# Patient Record
Sex: MALE | Race: WHITE | HISPANIC OR LATINO | URBAN - METROPOLITAN AREA
[De-identification: names, ages, dates, MRNs, and addresses within clinical notes are randomized per-mention and may not be internally consistent; named-entity substitution may affect disease eponyms.]

---

## 2018-12-18 ENCOUNTER — OUTPATIENT (OUTPATIENT)
Dept: OUTPATIENT SERVICES | Facility: HOSPITAL | Age: 33
LOS: 1 days | End: 2018-12-18
Payer: COMMERCIAL

## 2018-12-18 ENCOUNTER — RESULT REVIEW (OUTPATIENT)
Age: 33
End: 2018-12-18

## 2018-12-18 ENCOUNTER — APPOINTMENT (OUTPATIENT)
Dept: CT IMAGING | Facility: HOSPITAL | Age: 33
End: 2018-12-18

## 2018-12-18 DIAGNOSIS — D72.829 ELEVATED WHITE BLOOD CELL COUNT, UNSPECIFIED: ICD-10-CM

## 2018-12-18 DIAGNOSIS — Z96.669 PRESENCE OF UNSPECIFIED ARTIFICIAL ANKLE JOINT: Chronic | ICD-10-CM

## 2018-12-18 DIAGNOSIS — M35.2 BEHCET'S DISEASE: ICD-10-CM

## 2018-12-18 DIAGNOSIS — Z00.00 ENCOUNTER FOR GENERAL ADULT MEDICAL EXAMINATION WITHOUT ABNORMAL FINDINGS: ICD-10-CM

## 2018-12-18 PROCEDURE — 85097 BONE MARROW INTERPRETATION: CPT

## 2018-12-18 PROCEDURE — 88364 INSITU HYBRIDIZATION (FISH): CPT | Mod: 26

## 2018-12-18 PROCEDURE — 81276 KRAS GENE ADDL VARIANTS: CPT

## 2018-12-18 PROCEDURE — 88185 FLOWCYTOMETRY/TC ADD-ON: CPT

## 2018-12-18 PROCEDURE — 88365 INSITU HYBRIDIZATION (FISH): CPT | Mod: 26,59

## 2018-12-18 PROCEDURE — 88184 FLOWCYTOMETRY/ TC 1 MARKER: CPT

## 2018-12-18 PROCEDURE — 77012 CT SCAN FOR NEEDLE BIOPSY: CPT | Mod: 26

## 2018-12-18 PROCEDURE — 88342 IMHCHEM/IMCYTCHM 1ST ANTB: CPT

## 2018-12-18 PROCEDURE — 88275 CYTOGENETICS 100-300: CPT

## 2018-12-18 PROCEDURE — 81272 KIT GENE TARGETED SEQ ANALYS: CPT

## 2018-12-18 PROCEDURE — 81210 BRAF GENE: CPT

## 2018-12-18 PROCEDURE — 81120 IDH1 COMMON VARIANTS: CPT

## 2018-12-18 PROCEDURE — 81170 ABL1 GENE: CPT

## 2018-12-18 PROCEDURE — 81310 NPM1 GENE: CPT

## 2018-12-18 PROCEDURE — 88189 FLOWCYTOMETRY/READ 16 & >: CPT | Mod: 59

## 2018-12-18 PROCEDURE — 81245 FLT3 GENE: CPT

## 2018-12-18 PROCEDURE — 81402 MOPATH PROCEDURE LEVEL 3: CPT

## 2018-12-18 PROCEDURE — 81121 IDH2 COMMON VARIANTS: CPT

## 2018-12-18 PROCEDURE — 81275 KRAS GENE VARIANTS EXON 2: CPT

## 2018-12-18 PROCEDURE — 88280 CHROMOSOME KARYOTYPE STUDY: CPT

## 2018-12-18 PROCEDURE — 81311 NRAS GENE VARIANTS EXON 2&3: CPT

## 2018-12-18 PROCEDURE — 88237 TISSUE CULTURE BONE MARROW: CPT

## 2018-12-18 PROCEDURE — 88342 IMHCHEM/IMCYTCHM 1ST ANTB: CPT | Mod: 26

## 2018-12-18 PROCEDURE — 88365 INSITU HYBRIDIZATION (FISH): CPT

## 2018-12-18 PROCEDURE — 81219 CALR GENE COM VARIANTS: CPT

## 2018-12-18 PROCEDURE — 88264 CHROMOSOME ANALYSIS 20-25: CPT

## 2018-12-18 PROCEDURE — 20225 BONE BIOPSY TROCAR/NDL DEEP: CPT

## 2018-12-18 PROCEDURE — 81405 MOPATH PROCEDURE LEVEL 6: CPT

## 2018-12-18 PROCEDURE — 81270 JAK2 GENE: CPT

## 2018-12-18 PROCEDURE — 81403 MOPATH PROCEDURE LEVEL 4: CPT

## 2018-12-18 PROCEDURE — 88271 CYTOGENETICS DNA PROBE: CPT

## 2018-12-18 PROCEDURE — 81246 FLT3 GENE ANALYSIS: CPT

## 2018-12-18 PROCEDURE — 88291 CYTO/MOLECULAR REPORT: CPT

## 2018-12-18 PROCEDURE — 88313 SPECIAL STAINS GROUP 2: CPT | Mod: 26

## 2018-12-18 PROCEDURE — 77012 CT SCAN FOR NEEDLE BIOPSY: CPT

## 2018-12-18 PROCEDURE — 88305 TISSUE EXAM BY PATHOLOGIST: CPT

## 2018-12-18 PROCEDURE — 88364 INSITU HYBRIDIZATION (FISH): CPT

## 2018-12-18 PROCEDURE — 87205 SMEAR GRAM STAIN: CPT

## 2018-12-18 PROCEDURE — 88313 SPECIAL STAINS GROUP 2: CPT

## 2018-12-18 PROCEDURE — 88305 TISSUE EXAM BY PATHOLOGIST: CPT | Mod: 26

## 2018-12-19 LAB — TM INTERPRETATION: SIGNIFICANT CHANGE UP

## 2018-12-20 LAB — CHROM ANALY INTERPHASE BLD FISH-IMP: SIGNIFICANT CHANGE UP

## 2018-12-21 LAB — HEMATOPATHOLOGY REPORT: SIGNIFICANT CHANGE UP

## 2019-01-02 LAB
BLUEBERRY IGG RAST: SIGNIFICANT CHANGE UP
CARP IGE RAST: SIGNIFICANT CHANGE UP
CARROT IGG RAST: SIGNIFICANT CHANGE UP
CAULIFLOWER IGG RAST: SIGNIFICANT CHANGE UP
CHROM ANALY OVERALL INTERP SPEC-IMP: SIGNIFICANT CHANGE UP
ONKOSIGHT MYELOID SEQUENCE: NORMAL — SIGNIFICANT CHANGE UP

## 2019-02-05 DIAGNOSIS — D47.1 CHRONIC MYELOPROLIFERATIVE DISEASE: ICD-10-CM

## 2021-06-21 ENCOUNTER — EMERGENCY (EMERGENCY)
Facility: HOSPITAL | Age: 36
LOS: 1 days | Discharge: HOME | End: 2021-06-21
Attending: EMERGENCY MEDICINE
Payer: COMMERCIAL

## 2021-06-21 VITALS
WEIGHT: 248.9 LBS | SYSTOLIC BLOOD PRESSURE: 111 MMHG | OXYGEN SATURATION: 99 % | HEIGHT: 69 IN | DIASTOLIC BLOOD PRESSURE: 76 MMHG | HEART RATE: 111 BPM | TEMPERATURE: 98 F | RESPIRATION RATE: 20 BRPM

## 2021-06-21 DIAGNOSIS — Z96.669 PRESENCE OF UNSPECIFIED ARTIFICIAL ANKLE JOINT: Chronic | ICD-10-CM

## 2021-06-21 LAB
ALBUMIN SERPL ELPH-MCNC: 4.6 G/DL — SIGNIFICANT CHANGE UP (ref 3.3–5)
ALP SERPL-CCNC: 82 U/L — SIGNIFICANT CHANGE UP (ref 40–120)
ALT FLD-CCNC: 39 U/L — SIGNIFICANT CHANGE UP (ref 10–45)
ANION GAP SERPL CALC-SCNC: 14 MMOL/L — SIGNIFICANT CHANGE UP (ref 5–17)
APPEARANCE UR: CLEAR — SIGNIFICANT CHANGE UP
AST SERPL-CCNC: 21 U/L — SIGNIFICANT CHANGE UP (ref 10–40)
BACTERIA # UR AUTO: 0 — SIGNIFICANT CHANGE UP
BASE EXCESS BLDV CALC-SCNC: 4.1 MMOL/L — HIGH (ref -2–2)
BASOPHILS # BLD AUTO: 0.05 K/UL — SIGNIFICANT CHANGE UP (ref 0–0.2)
BASOPHILS NFR BLD AUTO: 0.3 % — SIGNIFICANT CHANGE UP (ref 0–2)
BILIRUB SERPL-MCNC: 0.5 MG/DL — SIGNIFICANT CHANGE UP (ref 0.2–1.2)
BILIRUB UR-MCNC: NEGATIVE — SIGNIFICANT CHANGE UP
BUN SERPL-MCNC: 22 MG/DL — SIGNIFICANT CHANGE UP (ref 7–23)
CA-I SERPL-SCNC: 1.11 MMOL/L — LOW (ref 1.12–1.3)
CALCIUM SERPL-MCNC: 10.1 MG/DL — SIGNIFICANT CHANGE UP (ref 8.4–10.5)
CHLORIDE BLDV-SCNC: 106 MMOL/L — SIGNIFICANT CHANGE UP (ref 96–108)
CHLORIDE SERPL-SCNC: 101 MMOL/L — SIGNIFICANT CHANGE UP (ref 96–108)
CO2 BLDV-SCNC: 29 MMOL/L — SIGNIFICANT CHANGE UP (ref 22–30)
CO2 SERPL-SCNC: 24 MMOL/L — SIGNIFICANT CHANGE UP (ref 22–31)
COLOR SPEC: YELLOW — SIGNIFICANT CHANGE UP
CREAT SERPL-MCNC: 0.69 MG/DL — SIGNIFICANT CHANGE UP (ref 0.5–1.3)
DIFF PNL FLD: NEGATIVE — SIGNIFICANT CHANGE UP
EOSINOPHIL # BLD AUTO: 0.1 K/UL — SIGNIFICANT CHANGE UP (ref 0–0.5)
EOSINOPHIL NFR BLD AUTO: 0.6 % — SIGNIFICANT CHANGE UP (ref 0–6)
EPI CELLS # UR: 0 /HPF — SIGNIFICANT CHANGE UP
GAS PNL BLDV: 135 MMOL/L — SIGNIFICANT CHANGE UP (ref 135–145)
GAS PNL BLDV: SIGNIFICANT CHANGE UP
GAS PNL BLDV: SIGNIFICANT CHANGE UP
GLUCOSE BLDV-MCNC: 109 MG/DL — HIGH (ref 70–99)
GLUCOSE SERPL-MCNC: 92 MG/DL — SIGNIFICANT CHANGE UP (ref 70–99)
GLUCOSE UR QL: NEGATIVE — SIGNIFICANT CHANGE UP
HCO3 BLDV-SCNC: 28 MMOL/L — SIGNIFICANT CHANGE UP (ref 21–29)
HCT VFR BLD CALC: 47.4 % — SIGNIFICANT CHANGE UP (ref 39–50)
HCT VFR BLDA CALC: 44 % — SIGNIFICANT CHANGE UP (ref 39–50)
HGB BLD CALC-MCNC: 14.5 G/DL — SIGNIFICANT CHANGE UP (ref 13–17)
HGB BLD-MCNC: 15.5 G/DL — SIGNIFICANT CHANGE UP (ref 13–17)
HOROWITZ INDEX BLDV+IHG-RTO: SIGNIFICANT CHANGE UP
IMM GRANULOCYTES NFR BLD AUTO: 0.4 % — SIGNIFICANT CHANGE UP (ref 0–1.5)
KETONES UR-MCNC: SIGNIFICANT CHANGE UP
LACTATE BLDV-MCNC: 1.5 MMOL/L — SIGNIFICANT CHANGE UP (ref 0.7–2)
LEUKOCYTE ESTERASE UR-ACNC: NEGATIVE — SIGNIFICANT CHANGE UP
LIDOCAIN IGE QN: 13 U/L — SIGNIFICANT CHANGE UP (ref 7–60)
LYMPHOCYTES # BLD AUTO: 0.85 K/UL — LOW (ref 1–3.3)
LYMPHOCYTES # BLD AUTO: 5.3 % — LOW (ref 13–44)
MCHC RBC-ENTMCNC: 30.9 PG — SIGNIFICANT CHANGE UP (ref 27–34)
MCHC RBC-ENTMCNC: 32.7 GM/DL — SIGNIFICANT CHANGE UP (ref 32–36)
MCV RBC AUTO: 94.6 FL — SIGNIFICANT CHANGE UP (ref 80–100)
MONOCYTES # BLD AUTO: 0.73 K/UL — SIGNIFICANT CHANGE UP (ref 0–0.9)
MONOCYTES NFR BLD AUTO: 4.5 % — SIGNIFICANT CHANGE UP (ref 2–14)
NEUTROPHILS # BLD AUTO: 14.38 K/UL — HIGH (ref 1.8–7.4)
NEUTROPHILS NFR BLD AUTO: 88.9 % — HIGH (ref 43–77)
NITRITE UR-MCNC: NEGATIVE — SIGNIFICANT CHANGE UP
NRBC # BLD: 0 /100 WBCS — SIGNIFICANT CHANGE UP (ref 0–0)
OTHER CELLS CSF MANUAL: 16 ML/DL — LOW (ref 18–22)
PCO2 BLDV: 42 MMHG — SIGNIFICANT CHANGE UP (ref 35–50)
PH BLDV: 7.44 — SIGNIFICANT CHANGE UP (ref 7.35–7.45)
PH UR: 6.5 — SIGNIFICANT CHANGE UP (ref 5–8)
PLATELET # BLD AUTO: 261 K/UL — SIGNIFICANT CHANGE UP (ref 150–400)
PO2 BLDV: 44 MMHG — SIGNIFICANT CHANGE UP (ref 25–45)
POTASSIUM BLDV-SCNC: 5.1 MMOL/L — SIGNIFICANT CHANGE UP (ref 3.5–5.3)
POTASSIUM SERPL-MCNC: 3.9 MMOL/L — SIGNIFICANT CHANGE UP (ref 3.5–5.3)
POTASSIUM SERPL-SCNC: 3.9 MMOL/L — SIGNIFICANT CHANGE UP (ref 3.5–5.3)
PROT SERPL-MCNC: 8.2 G/DL — SIGNIFICANT CHANGE UP (ref 6–8.3)
PROT UR-MCNC: ABNORMAL
RBC # BLD: 5.01 M/UL — SIGNIFICANT CHANGE UP (ref 4.2–5.8)
RBC # FLD: 13.4 % — SIGNIFICANT CHANGE UP (ref 10.3–14.5)
RBC CASTS # UR COMP ASSIST: 2 /HPF — SIGNIFICANT CHANGE UP (ref 0–4)
SAO2 % BLDV: 79 % — SIGNIFICANT CHANGE UP (ref 67–88)
SARS-COV-2 RNA SPEC QL NAA+PROBE: SIGNIFICANT CHANGE UP
SODIUM SERPL-SCNC: 139 MMOL/L — SIGNIFICANT CHANGE UP (ref 135–145)
SP GR SPEC: 1.03 — HIGH (ref 1.01–1.02)
UROBILINOGEN FLD QL: NEGATIVE — SIGNIFICANT CHANGE UP
WBC # BLD: 16.17 K/UL — HIGH (ref 3.8–10.5)
WBC # FLD AUTO: 16.17 K/UL — HIGH (ref 3.8–10.5)
WBC UR QL: 1 /HPF — SIGNIFICANT CHANGE UP (ref 0–5)

## 2021-06-21 PROCEDURE — 76705 ECHO EXAM OF ABDOMEN: CPT | Mod: 26

## 2021-06-21 PROCEDURE — 99284 EMERGENCY DEPT VISIT MOD MDM: CPT

## 2021-06-21 RX ORDER — SODIUM CHLORIDE 9 MG/ML
1000 INJECTION, SOLUTION INTRAVENOUS ONCE
Refills: 0 | Status: COMPLETED | OUTPATIENT
Start: 2021-06-21 | End: 2021-06-21

## 2021-06-21 RX ORDER — CETIRIZINE HYDROCHLORIDE 10 MG/1
0 TABLET ORAL
Qty: 0 | Refills: 0 | DISCHARGE

## 2021-06-21 RX ORDER — COLCHICINE 0.6 MG
0 TABLET ORAL
Qty: 0 | Refills: 0 | DISCHARGE

## 2021-06-21 RX ORDER — TETANUS TOXOID, REDUCED DIPHTHERIA TOXOID AND ACELLULAR PERTUSSIS VACCINE, ADSORBED 5; 2.5; 8; 8; 2.5 [IU]/.5ML; [IU]/.5ML; UG/.5ML; UG/.5ML; UG/.5ML
0.5 SUSPENSION INTRAMUSCULAR ONCE
Refills: 0 | Status: COMPLETED | OUTPATIENT
Start: 2021-06-21 | End: 2021-06-21

## 2021-06-21 RX ORDER — VILAZODONE HYDROCHLORIDE 20 MG/1
1 TABLET, FILM COATED ORAL
Qty: 0 | Refills: 0 | DISCHARGE

## 2021-06-21 RX ORDER — MORPHINE SULFATE 50 MG/1
4 CAPSULE, EXTENDED RELEASE ORAL ONCE
Refills: 0 | Status: DISCONTINUED | OUTPATIENT
Start: 2021-06-21 | End: 2021-06-21

## 2021-06-21 RX ORDER — ROSUVASTATIN CALCIUM 5 MG/1
0 TABLET ORAL
Qty: 0 | Refills: 0 | DISCHARGE

## 2021-06-21 RX ORDER — SUCRALFATE 1 G
1 TABLET ORAL ONCE
Refills: 0 | Status: COMPLETED | OUTPATIENT
Start: 2021-06-21 | End: 2021-06-21

## 2021-06-21 RX ORDER — ACETAMINOPHEN 500 MG
975 TABLET ORAL ONCE
Refills: 0 | Status: COMPLETED | OUTPATIENT
Start: 2021-06-21 | End: 2021-06-21

## 2021-06-21 RX ORDER — IBUPROFEN 200 MG
600 TABLET ORAL ONCE
Refills: 0 | Status: COMPLETED | OUTPATIENT
Start: 2021-06-21 | End: 2021-06-21

## 2021-06-21 RX ORDER — ONDANSETRON 8 MG/1
4 TABLET, FILM COATED ORAL ONCE
Refills: 0 | Status: COMPLETED | OUTPATIENT
Start: 2021-06-21 | End: 2021-06-21

## 2021-06-21 RX ADMIN — ONDANSETRON 4 MILLIGRAM(S): 8 TABLET, FILM COATED ORAL at 19:22

## 2021-06-21 RX ADMIN — Medication 975 MILLIGRAM(S): at 20:21

## 2021-06-21 RX ADMIN — Medication 600 MILLIGRAM(S): at 20:21

## 2021-06-21 RX ADMIN — Medication 30 MILLILITER(S): at 19:24

## 2021-06-21 RX ADMIN — SODIUM CHLORIDE 1000 MILLILITER(S): 9 INJECTION, SOLUTION INTRAVENOUS at 19:24

## 2021-06-21 RX ADMIN — Medication 1 GRAM(S): at 22:04

## 2021-06-21 RX ADMIN — SODIUM CHLORIDE 1000 MILLILITER(S): 9 INJECTION, SOLUTION INTRAVENOUS at 23:09

## 2021-06-21 RX ADMIN — MORPHINE SULFATE 4 MILLIGRAM(S): 50 CAPSULE, EXTENDED RELEASE ORAL at 23:09

## 2021-06-21 NOTE — ED PROVIDER NOTE - PHYSICAL EXAMINATION
GEN: mild distress secondary to pain, awake, eyes open spontaneously, tearful  EYES: normal conjunctiva, perrl  ENT: NCAT, mildly dry MM, Trachea midline  CHEST/LUNGS: Non-tachypneic, CTAB, bilateral breath sounds  CARDIAC: Non-tachycardic, normal perfusion  ABDOMEN: Soft, NTND, No rebound/guarding  : Dr. Garland- no inguinal swelling or testicular tenderness to palpation  MSK: No edema, no gross deformity of extremities  SKIN: No rashes, no petechiae, no vesicles  NEURO: CN grossly intact, normal coordination, no focal motor or sensory deficits  PSYCH: Alert, appropriate, cooperative, with capacity and insight

## 2021-06-21 NOTE — ED PROVIDER NOTE - CLINICAL SUMMARY MEDICAL DECISION MAKING FREE TEXT BOX
Judson Shepherd MD, FACEP: In this physician's medical judgement based on clinical history and physical exam, patient with four episodes of emesis, urinary frequency, nausea, and vomiting x 4 today. Patient has decreased po intake. Patient reported pain to the epigastrium at triage but without pain here on history or clinical exam. will follow labs from triage, will treat symptoms as required and reassess.  Will follow up on labs, analgesia, imaging if necessary, reassess and disposition as clinically indicated. Judson Shepherd MD, FACEP: In this physician's medical judgement based on clinical history and physical exam, patient with four episodes of emesis, urinary frequency, nausea, and vomiting x 4 today. Patient has decreased po intake. Patient reported pain to the epigastrium at triage but without pain here on history or clinical exam. will follow labs from triage, will treat symptoms as required and reassess.  Will follow up on labs, analgesia, imaging if necessary, reassess and disposition as clinically indicated.  CTA and ultrasound added  ultrasound without pathology  pending CTA, patient treated with morphine, iSTOP evaluation and patient only on THC

## 2021-06-21 NOTE — ED ADULT NURSE NOTE - OBJECTIVE STATEMENT
Pt presents for eval of generalized abd pain, mostly periumbilical or in bilateral lower quadrants.  Abd soft.  Also reports n/v/d.  Oral mucosa moist.

## 2021-06-21 NOTE — ED PROVIDER NOTE - PROGRESS NOTE DETAILS
Judson Shepherd MD, FACEP patient complaining of vague epigastric and supraumbilical pain, cannot characterize the pain, tearful and pain is out of proportion, ultrasound right upper quadrant without gross pathology and CTA a/p ordered for vascular evaluation and evaluation for internal pathology consistent with patient's pain. Jake Garland, PGY-3: patient wants to leave, refusing to stay for results, states he will come back if called with abnormal results. Understands the risks of delayed diagnosis and worsening condition if leaves. States that his pain is improved. Jake Garland, PGY-3: ct negative for acute pathology, patient told the results, patient dc'd.

## 2021-06-21 NOTE — ED PROVIDER NOTE - PATIENT PORTAL LINK FT
You can access the FollowMyHealth Patient Portal offered by [facility] by registering at the following website: [email or web address]. By joining [employer]’s FollowMyHealth portal, you will also be able to view your health information using other applications (apps) compatible with our system.

## 2021-06-21 NOTE — ED PROVIDER NOTE - OBJECTIVE STATEMENT
34 y/o M hx bechets on colchicine- stable dose, here with n/v x1 day, 4 episodes of vomiting which he states is highly unusual for him. No current nausea, mild abd pain predominantly epigastric. Diarrhea above baseline for him. Afebrile but "feels cold". 36 y/o M hx bechets on colchicine- stable dose, here with n/v x1 day, 4 episodes of vomiting which he states is highly unusual for him. No current nausea, mild abd pain predominantly epigastric. Diarrhea above baseline for him. Afebrile but "feels cold". Of note did hit his head with a pipe while working on a car last week. No HA. Came 36 y/o M hx bechets on colchicine- stable dose, here with n/v x1 day, 4 episodes of vomiting which he states is highly unusual for him. No current nausea, mild abd pain predominantly epigastric. Diarrhea above baseline for him. Afebrile but "feels cold". Of note did hit his head with a pipe while working on a car last week. No HA. Came at request of friend who is a PA and is concerned about the head injury with vomiting.

## 2021-06-21 NOTE — ED PROVIDER NOTE - ATTENDING CONTRIBUTION TO CARE
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 Patient never has this issue and had four episodes of vomiting today. Patient had a head injury secondary to direct strike by a pipe last week without focal neurological deficits. + abdominal pain and diarrhea that is normal with history of colchicine use.   qpa labs and therapies noted   afebrile, benign exam, will monitor and observe and reassess  Will follow up on labs, analgesia, reassess and disposition as clinically indicated. 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 Patient never has this issue and had four episodes of vomiting today. Patient had a head injury secondary to direct strike by a pipe last week without focal neurological deficits. + abdominal pain and diarrhea that is normal with history of colchicine use.   qpa labs and therapies noted   afebrile, benign exam, will monitor and observe and reassess  Will follow up on labs, analgesia, reassess and disposition as clinically indicated.  See MDM above.

## 2021-06-21 NOTE — ED PROVIDER NOTE - NSFOLLOWUPINSTRUCTIONS_ED_ALL_ED_FT
WHAT YOU NEED TO KNOW:    Discharge against medical advice means that you choose to leave the hospital before your healthcare provider recommends that you do. Your healthcare provider may still need to diagnose or treat your condition or your treatment may not be complete.     Return to the emergency department if you symptoms worsen.      DISCHARGE INSTRUCTIONS:    Risks: Risks of leaving the hospital before your healthcare providers recommend include the following:  •Your condition may cause other health problems if not treated properly.       •You may need to be admitted to the hospital again for the same condition.       •Your condition could become life-threatening.       Follow up with your healthcare provider as directed: Write down your questions so you remember to ask them during your visits.       Abdominal Pain    WHAT YOU NEED TO KNOW:    Abdominal pain can be dull, achy, or sharp. You may have pain in one area of your abdomen, or in your entire abdomen. Your pain may be caused by a condition such as constipation, food sensitivity or poisoning, infection, or a blockage. Abdominal pain can also be from a hernia, appendicitis, or an ulcer. Liver, gallbladder, or kidney conditions can also cause abdominal pain. The cause of your abdominal pain may be unknown.     DISCHARGE INSTRUCTIONS:    Return to the emergency department if:   •You have new chest pain or shortness of breath.      •You have pulsing pain in your upper abdomen or lower back that suddenly becomes constant.      •Your pain is in the right lower abdominal area and worsens with movement.       •You have a fever over 100.4°F (38°C) or shaking chills.       •You are vomiting and cannot keep food or liquids down.       •Your pain does not improve or gets worse over the next 8 to 12 hours.       •You see blood in your vomit or bowel movements, or they look black and tarry.       •Your skin or the whites of your eyes turn yellow.       •You are a woman and have a large amount of vaginal bleeding that is not your monthly period.       Contact your healthcare provider if:   •You have pain in your lower back.       •You are a man and have pain in your testicles.      •You have pain when you urinate.       •You have questions or concerns about your condition or care.      Follow up with your healthcare provider within 24 hours or as directed: Write down your questions so you remember to ask them during your visits.     Medicines:   •Medicines may be given to calm your stomach and prevent vomiting or to decrease pain. Ask how to take pain medicine safely.      •Take your medicine as directed. Contact your healthcare provider if you think your medicine is not helping or if you have side effects. Tell him of her if you are allergic to any medicine. Keep a list of the medicines, vitamins, and herbs you take. Include the amounts, and when and why you take them. Bring the list or the pill bottles to follow-up visits. Carry your medicine list with you in case of an emergency. DISCHARGE INSTRUCTIONS:    Risks: Risks of leaving the hospital before your healthcare providers recommend include the following:  •Your condition may cause other health problems if not treated properly.       •You may need to be admitted to the hospital again for the same condition.       •Your condition could become life-threatening.       Follow up with your healthcare provider as directed: Write down your questions so you remember to ask them during your visits.       Abdominal Pain    WHAT YOU NEED TO KNOW:    Abdominal pain can be dull, achy, or sharp. You may have pain in one area of your abdomen, or in your entire abdomen. Your pain may be caused by a condition such as constipation, food sensitivity or poisoning, infection, or a blockage. Abdominal pain can also be from a hernia, appendicitis, or an ulcer. Liver, gallbladder, or kidney conditions can also cause abdominal pain. The cause of your abdominal pain may be unknown.     DISCHARGE INSTRUCTIONS:    Return to the emergency department if:   •You have new chest pain or shortness of breath.      •You have pulsing pain in your upper abdomen or lower back that suddenly becomes constant.      •Your pain is in the right lower abdominal area and worsens with movement.       •You have a fever over 100.4°F (38°C) or shaking chills.       •You are vomiting and cannot keep food or liquids down.       •Your pain does not improve or gets worse over the next 8 to 12 hours.       •You see blood in your vomit or bowel movements, or they look black and tarry.       •Your skin or the whites of your eyes turn yellow.       •You are a woman and have a large amount of vaginal bleeding that is not your monthly period.       Contact your healthcare provider if:   •You have pain in your lower back.       •You are a man and have pain in your testicles.      •You have pain when you urinate.       •You have questions or concerns about your condition or care.      Follow up with your healthcare provider within 24 hours or as directed: Write down your questions so you remember to ask them during your visits.     Medicines:   •Medicines may be given to calm your stomach and prevent vomiting or to decrease pain. Ask how to take pain medicine safely.      •Take your medicine as directed. Contact your healthcare provider if you think your medicine is not helping or if you have side effects. Tell him of her if you are allergic to any medicine. Keep a list of the medicines, vitamins, and herbs you take. Include the amounts, and when and why you take them. Bring the list or the pill bottles to follow-up visits. Carry your medicine list with you in case of an emergency.

## 2021-06-21 NOTE — ED ADULT NURSE REASSESSMENT NOTE - NS ED NURSE REASSESS COMMENT FT1
Received report from Violeta CRAWFORD. Patient AAOX3, c/o abdominal pain, medicated per ED MDs orders. VSS, will continue to monitor.

## 2021-06-21 NOTE — ED PROVIDER NOTE - SHIFT CHANGE DETAILS
Judson Shepherd MD FACEP note of transfer at the usual time of sign out: Receiving team will follow up on labs, analgesia, any clinical imaging, reassess and disposition as clinically indicated.  Details of patient and plan conveyed to receiving physician and conveyed back for understanding.  There were no questions at this time about the patient's status, disposition, and plan. Patient's care to be taken over by receiving physician at this time, all decisions regarding the progression of care will be made at their discretion.

## 2021-06-21 NOTE — ED PROVIDER NOTE - RAPID ASSESSMENT
35y M PMHx Behcet's disease p/w abd pain, numerous episodes of diarrhea, urinary frequency, nausea, 4 episodes of vomiting x 1 day. Endorses reduced PO intake 2/2 vomiting. Pt is particularly concerned over his vomiting because normally, it is rare for him to vomit, but he has had multiple episodes in the last 24 hours. Denies vision changes. No hx abd surgery. No blood thinner use. Pt also notes he hit his head a week ago, with copious bleeding.    IShravan (Scribe), have documented this rapid assessment note under the dictation of Guerrero Garcia (JULIA), which has been reviewed and affirmed to be accurate.    Scribe Statement: IShravan, attest that this documentation has been prepared under the direction and in the presence of Guerrero Garcia (JULIA) 35y M PMHx Behcet's disease p/w abd pain, numerous episodes of diarrhea, urinary frequency, nausea, 4 episodes of vomiting x 1 day. Endorses reduced PO intake 2/2 vomiting. Pt is particularly concerned over his vomiting because normally, it is rare for him to vomit, but he has had multiple episodes in the last 24 hours. Denies vision changes. No hx abd surgery. No blood thinner use. Pt also notes he hit his head a week ago, with copious bleeding that resolved w/direct pressure. denies LOC.    Shravan CASH (Antonella), have documented this rapid assessment note under the dictation of Guerrero Garcia (JULIA), which has been reviewed and affirmed to be accurate.    Scribe Statement: Shravan CASH, attest that this documentation has been prepared under the direction and in the presence of Guerrero Garcia (JULIA)    Guerrero CASH PA-C saw patient as a rapid assessment initially via telemedicine encounter. The rest of care to be performed by the primary ED team. Rapid assessment documented by antonella in my presence. I have personally reviewed and approved the note written by the kavonibe. Receiving team will follow up on labs, analgesia, any clinical imaging, and perform reassessment and disposition of the patient as clinically indicated. All decisions regarding the progression of care will be made at their discretion.

## 2021-06-22 VITALS
TEMPERATURE: 98 F | SYSTOLIC BLOOD PRESSURE: 147 MMHG | HEART RATE: 87 BPM | DIASTOLIC BLOOD PRESSURE: 77 MMHG | RESPIRATION RATE: 18 BRPM | OXYGEN SATURATION: 99 %

## 2021-06-22 PROCEDURE — 74174 CTA ABD&PLVS W/CONTRAST: CPT

## 2021-06-22 PROCEDURE — 83605 ASSAY OF LACTIC ACID: CPT

## 2021-06-22 PROCEDURE — 83690 ASSAY OF LIPASE: CPT

## 2021-06-22 PROCEDURE — 90471 IMMUNIZATION ADMIN: CPT

## 2021-06-22 PROCEDURE — 74174 CTA ABD&PLVS W/CONTRAST: CPT | Mod: 26,MA

## 2021-06-22 PROCEDURE — 84295 ASSAY OF SERUM SODIUM: CPT

## 2021-06-22 PROCEDURE — 85025 COMPLETE CBC W/AUTO DIFF WBC: CPT

## 2021-06-22 PROCEDURE — 85014 HEMATOCRIT: CPT

## 2021-06-22 PROCEDURE — 84132 ASSAY OF SERUM POTASSIUM: CPT

## 2021-06-22 PROCEDURE — 76705 ECHO EXAM OF ABDOMEN: CPT

## 2021-06-22 PROCEDURE — 80053 COMPREHEN METABOLIC PANEL: CPT

## 2021-06-22 PROCEDURE — 82565 ASSAY OF CREATININE: CPT

## 2021-06-22 PROCEDURE — 82947 ASSAY GLUCOSE BLOOD QUANT: CPT

## 2021-06-22 PROCEDURE — 87635 SARS-COV-2 COVID-19 AMP PRB: CPT

## 2021-06-22 PROCEDURE — 82435 ASSAY OF BLOOD CHLORIDE: CPT

## 2021-06-22 PROCEDURE — 81001 URINALYSIS AUTO W/SCOPE: CPT

## 2021-06-22 PROCEDURE — 96374 THER/PROPH/DIAG INJ IV PUSH: CPT | Mod: XU

## 2021-06-22 PROCEDURE — 82330 ASSAY OF CALCIUM: CPT

## 2021-06-22 PROCEDURE — 99284 EMERGENCY DEPT VISIT MOD MDM: CPT | Mod: 25

## 2021-06-22 PROCEDURE — 82803 BLOOD GASES ANY COMBINATION: CPT

## 2021-06-22 PROCEDURE — 96375 TX/PRO/DX INJ NEW DRUG ADDON: CPT | Mod: XU

## 2021-06-22 PROCEDURE — 85018 HEMOGLOBIN: CPT

## 2021-06-22 NOTE — ED ADULT NURSE REASSESSMENT NOTE - NS ED NURSE REASSESS COMMENT FT1
Patient does not want to stay any longer, ED MD Garland made aware. Pt told that we are waiting for CT results and doing the best we can.

## 2022-09-15 ENCOUNTER — EMERGENCY (EMERGENCY)
Facility: HOSPITAL | Age: 37
LOS: 1 days | Discharge: HOME | End: 2022-09-15
Attending: EMERGENCY MEDICINE
Payer: COMMERCIAL

## 2022-09-15 VITALS
RESPIRATION RATE: 20 BRPM | WEIGHT: 255.07 LBS | SYSTOLIC BLOOD PRESSURE: 117 MMHG | HEART RATE: 89 BPM | HEIGHT: 69 IN | DIASTOLIC BLOOD PRESSURE: 69 MMHG | OXYGEN SATURATION: 98 % | TEMPERATURE: 98 F

## 2022-09-15 DIAGNOSIS — Z96.669 PRESENCE OF UNSPECIFIED ARTIFICIAL ANKLE JOINT: Chronic | ICD-10-CM

## 2022-09-15 PROCEDURE — 99283 EMERGENCY DEPT VISIT LOW MDM: CPT

## 2022-09-16 ENCOUNTER — EMERGENCY (EMERGENCY)
Facility: HOSPITAL | Age: 37
LOS: 1 days | Discharge: HOME | End: 2022-09-16
Attending: EMERGENCY MEDICINE
Payer: COMMERCIAL

## 2022-09-16 VITALS
HEART RATE: 114 BPM | TEMPERATURE: 99 F | HEIGHT: 69 IN | OXYGEN SATURATION: 97 % | WEIGHT: 255.07 LBS | SYSTOLIC BLOOD PRESSURE: 137 MMHG | DIASTOLIC BLOOD PRESSURE: 76 MMHG | RESPIRATION RATE: 20 BRPM

## 2022-09-16 VITALS
SYSTOLIC BLOOD PRESSURE: 121 MMHG | TEMPERATURE: 98 F | DIASTOLIC BLOOD PRESSURE: 77 MMHG | HEART RATE: 94 BPM | RESPIRATION RATE: 15 BRPM | OXYGEN SATURATION: 100 %

## 2022-09-16 VITALS
HEART RATE: 95 BPM | TEMPERATURE: 98 F | SYSTOLIC BLOOD PRESSURE: 130 MMHG | OXYGEN SATURATION: 95 % | RESPIRATION RATE: 18 BRPM | DIASTOLIC BLOOD PRESSURE: 85 MMHG

## 2022-09-16 DIAGNOSIS — H60.90 UNSPECIFIED OTITIS EXTERNA, UNSPECIFIED EAR: ICD-10-CM

## 2022-09-16 DIAGNOSIS — Z96.669 PRESENCE OF UNSPECIFIED ARTIFICIAL ANKLE JOINT: Chronic | ICD-10-CM

## 2022-09-16 PROBLEM — M35.2 BEHCET'S DISEASE: Chronic | Status: ACTIVE | Noted: 2021-06-21

## 2022-09-16 LAB
ALBUMIN SERPL ELPH-MCNC: 4.6 G/DL — SIGNIFICANT CHANGE UP (ref 3.3–5)
ALP SERPL-CCNC: 88 U/L — SIGNIFICANT CHANGE UP (ref 40–120)
ALT FLD-CCNC: 30 U/L — SIGNIFICANT CHANGE UP (ref 10–45)
ANION GAP SERPL CALC-SCNC: 9 MMOL/L — SIGNIFICANT CHANGE UP (ref 5–17)
AST SERPL-CCNC: 15 U/L — SIGNIFICANT CHANGE UP (ref 10–40)
BASOPHILS # BLD AUTO: 0.29 K/UL — HIGH (ref 0–0.2)
BASOPHILS NFR BLD AUTO: 1.7 % — SIGNIFICANT CHANGE UP (ref 0–2)
BILIRUB SERPL-MCNC: 0.2 MG/DL — SIGNIFICANT CHANGE UP (ref 0.2–1.2)
BUN SERPL-MCNC: 14 MG/DL — SIGNIFICANT CHANGE UP (ref 7–23)
CALCIUM SERPL-MCNC: 9.4 MG/DL — SIGNIFICANT CHANGE UP (ref 8.4–10.5)
CHLORIDE SERPL-SCNC: 101 MMOL/L — SIGNIFICANT CHANGE UP (ref 96–108)
CO2 SERPL-SCNC: 29 MMOL/L — SIGNIFICANT CHANGE UP (ref 22–31)
CREAT SERPL-MCNC: 0.61 MG/DL — SIGNIFICANT CHANGE UP (ref 0.5–1.3)
EGFR: 129 ML/MIN/1.73M2 — SIGNIFICANT CHANGE UP
EOSINOPHIL # BLD AUTO: 0.29 K/UL — SIGNIFICANT CHANGE UP (ref 0–0.5)
EOSINOPHIL NFR BLD AUTO: 1.7 % — SIGNIFICANT CHANGE UP (ref 0–6)
GLUCOSE SERPL-MCNC: 120 MG/DL — HIGH (ref 70–99)
HCT VFR BLD CALC: 43.5 % — SIGNIFICANT CHANGE UP (ref 39–50)
HGB BLD-MCNC: 14.1 G/DL — SIGNIFICANT CHANGE UP (ref 13–17)
LYMPHOCYTES # BLD AUTO: 1.9 K/UL — SIGNIFICANT CHANGE UP (ref 1–3.3)
LYMPHOCYTES # BLD AUTO: 11 % — LOW (ref 13–44)
MANUAL SMEAR VERIFICATION: SIGNIFICANT CHANGE UP
MCHC RBC-ENTMCNC: 30.9 PG — SIGNIFICANT CHANGE UP (ref 27–34)
MCHC RBC-ENTMCNC: 32.4 GM/DL — SIGNIFICANT CHANGE UP (ref 32–36)
MCV RBC AUTO: 95.4 FL — SIGNIFICANT CHANGE UP (ref 80–100)
MONOCYTES # BLD AUTO: 1.18 K/UL — HIGH (ref 0–0.9)
MONOCYTES NFR BLD AUTO: 6.8 % — SIGNIFICANT CHANGE UP (ref 2–14)
NEUTROPHILS # BLD AUTO: 12.91 K/UL — HIGH (ref 1.8–7.4)
NEUTROPHILS NFR BLD AUTO: 74.6 % — SIGNIFICANT CHANGE UP (ref 43–77)
PLAT MORPH BLD: NORMAL — SIGNIFICANT CHANGE UP
PLATELET # BLD AUTO: 257 K/UL — SIGNIFICANT CHANGE UP (ref 150–400)
POTASSIUM SERPL-MCNC: 3.7 MMOL/L — SIGNIFICANT CHANGE UP (ref 3.5–5.3)
POTASSIUM SERPL-SCNC: 3.7 MMOL/L — SIGNIFICANT CHANGE UP (ref 3.5–5.3)
PROT SERPL-MCNC: 8.2 G/DL — SIGNIFICANT CHANGE UP (ref 6–8.3)
RBC # BLD: 4.56 M/UL — SIGNIFICANT CHANGE UP (ref 4.2–5.8)
RBC # FLD: 13.5 % — SIGNIFICANT CHANGE UP (ref 10.3–14.5)
RBC BLD AUTO: SIGNIFICANT CHANGE UP
SODIUM SERPL-SCNC: 139 MMOL/L — SIGNIFICANT CHANGE UP (ref 135–145)
VARIANT LYMPHS # BLD: 4.2 % — SIGNIFICANT CHANGE UP (ref 0–6)
WBC # BLD: 17.3 K/UL — HIGH (ref 3.8–10.5)
WBC # FLD AUTO: 17.3 K/UL — HIGH (ref 3.8–10.5)

## 2022-09-16 PROCEDURE — 70481 CT ORBIT/EAR/FOSSA W/DYE: CPT | Mod: 26,MA

## 2022-09-16 PROCEDURE — 99283 EMERGENCY DEPT VISIT LOW MDM: CPT

## 2022-09-16 PROCEDURE — 85025 COMPLETE CBC W/AUTO DIFF WBC: CPT

## 2022-09-16 PROCEDURE — 99284 EMERGENCY DEPT VISIT MOD MDM: CPT | Mod: 25

## 2022-09-16 PROCEDURE — 36415 COLL VENOUS BLD VENIPUNCTURE: CPT

## 2022-09-16 PROCEDURE — 96360 HYDRATION IV INFUSION INIT: CPT | Mod: XU

## 2022-09-16 PROCEDURE — 70481 CT ORBIT/EAR/FOSSA W/DYE: CPT | Mod: MA

## 2022-09-16 PROCEDURE — 99285 EMERGENCY DEPT VISIT HI MDM: CPT

## 2022-09-16 PROCEDURE — 80053 COMPREHEN METABOLIC PANEL: CPT

## 2022-09-16 PROCEDURE — 85652 RBC SED RATE AUTOMATED: CPT

## 2022-09-16 RX ORDER — SODIUM CHLORIDE 9 MG/ML
1000 INJECTION INTRAMUSCULAR; INTRAVENOUS; SUBCUTANEOUS ONCE
Refills: 0 | Status: COMPLETED | OUTPATIENT
Start: 2022-09-16 | End: 2022-09-16

## 2022-09-16 RX ORDER — ACETAMINOPHEN 500 MG
975 TABLET ORAL ONCE
Refills: 0 | Status: COMPLETED | OUTPATIENT
Start: 2022-09-16 | End: 2022-09-16

## 2022-09-16 RX ORDER — CIPROFLOXACIN AND DEXAMETHASONE 3; 1 MG/ML; MG/ML
4 SUSPENSION/ DROPS AURICULAR (OTIC)
Qty: 1 | Refills: 0
Start: 2022-09-16 | End: 2022-09-22

## 2022-09-16 RX ORDER — IBUPROFEN 200 MG
400 TABLET ORAL ONCE
Refills: 0 | Status: COMPLETED | OUTPATIENT
Start: 2022-09-16 | End: 2022-09-16

## 2022-09-16 RX ORDER — IBUPROFEN 200 MG
600 TABLET ORAL ONCE
Refills: 0 | Status: COMPLETED | OUTPATIENT
Start: 2022-09-16 | End: 2022-09-16

## 2022-09-16 RX ORDER — CIPROFLOXACIN HCL 0.3 %
1 DROPS OPHTHALMIC (EYE) ONCE
Refills: 0 | Status: COMPLETED | OUTPATIENT
Start: 2022-09-16 | End: 2022-09-16

## 2022-09-16 RX ADMIN — Medication 975 MILLIGRAM(S): at 19:31

## 2022-09-16 RX ADMIN — Medication 600 MILLIGRAM(S): at 19:31

## 2022-09-16 RX ADMIN — SODIUM CHLORIDE 1000 MILLILITER(S): 9 INJECTION INTRAMUSCULAR; INTRAVENOUS; SUBCUTANEOUS at 20:10

## 2022-09-16 RX ADMIN — SODIUM CHLORIDE 1000 MILLILITER(S): 9 INJECTION INTRAMUSCULAR; INTRAVENOUS; SUBCUTANEOUS at 21:10

## 2022-09-16 RX ADMIN — Medication 975 MILLIGRAM(S): at 20:01

## 2022-09-16 RX ADMIN — Medication 400 MILLIGRAM(S): at 01:38

## 2022-09-16 RX ADMIN — Medication 1 DROP(S): at 01:38

## 2022-09-16 RX ADMIN — Medication 600 MILLIGRAM(S): at 21:01

## 2022-09-16 NOTE — ED ADULT TRIAGE NOTE - NSWEIGHTCALCTOOLDRUG_GEN_A_CORE
Received refill request. Last appt date verified. Medication verified. Refill was given per protocol.
 used

## 2022-09-16 NOTE — ED ADULT NURSE NOTE - OBJECTIVE STATEMENT
patient is a 37 y/o M with no pertinent PMH who presents to the ED c/o worsening left ear pain x2 days. patient states that "I think I have an ear infection", and patient attempted to clean out ear with qtip which made the pain worse. patient states that he did not take any pain medicine PTA. denies hearing changes or discharge from ear. patient resting in NAD. a&ox4, ambulatory independently. respirations even and unlabored. abdomen soft and nondistended. skin warm, dry, and intact. ear drops administered. MD Hi at bedside to assess

## 2022-09-16 NOTE — ED PROVIDER NOTE - NSFOLLOWUPINSTRUCTIONS_ED_ALL_ED_FT
Please see the information of Otitis Media and Otitis Externa.    You are discharge against medical advice and understanding the possible dangers/risks after ED discharge.     Return for any concerns, fever, weakness, facial swelling, or worsening pain.     Keep continue your current medications as prescribed including Ciprodex.    Levaquin as prescribed.    Take Ibuprofen or Tylenol for pain as package directed.    Follow up with your ENT Dr or Dr. Ramachandran, call Monday for appointment.

## 2022-09-16 NOTE — ED PROVIDER NOTE - CPE EDP SKIN NORM
I will SWITCH the dose or number of times a day I take the medications listed below when I get home from the hospital:  None normal...

## 2022-09-16 NOTE — ED PROVIDER NOTE - PATIENT PORTAL LINK FT
You can access the FollowMyHealth Patient Portal offered by [facility] by registering at the following website: [email or web address]. By joining [email or web address]’s FollowMyHealth portal, you will also be able to view your health information using other applications (apps) compatible with our system.

## 2022-09-16 NOTE — ED PROVIDER NOTE - CARE PROVIDER_API CALL
Dima Ramachandran)  Otolaryngology  [address] [place] [address], [place] [address]  Phone: [phone]  Fax: [phone]  Follow Up Time:

## 2022-09-16 NOTE — ED PROVIDER NOTE - OBJECTIVE STATEMENT
37yo male pt with PMHx of Behcet's disease returned to ED for worsening left ear pain today. Reports he's had mild left otalgia since one week ago and worsening pain for 3days. Pt's evaluated in ED last night and on Ciprodex without relief. He noticed worsening pain with neck pain this afternoon. Denies fever, chills, cough or sore throat. Denies sensory changes or weakness to extremities. Denies CP/SOB/ABD pain or N/V/D.

## 2022-09-16 NOTE — ED PROVIDER NOTE - PATIENT PORTAL LINK FT
You can access the FollowMyHealth Patient Portal offered by [facility] by registering at the following website: [email or web address]. By joining [employer]’s FollowMyHealth portal, you will also be able to view your health information using other applications (apps) compatible with our system.

## 2022-09-16 NOTE — CONSULT NOTE ADULT - ASSESSMENT
35 yo M w/PMHx of Behcet's disease presents to the ED for L sided ear pain for several days, gradual onset, Pt reports severe pain to outer ear. Believes he has a ear infection after scratching his inner ear with dirty hands and attempted to clear ear out with a q-tip. Came to the ED yesterday and was discharged on ciprodex now c/o of worsening pain. Would recommend a CT at this time as pt is tachy and WBC is 17.3 37 yo M w/PMHx of Behcet's disease presents to the ED for L sided ear pain for several days, gradual onset, Pt reports severe pain to outer ear. Believes he has a ear infection after scratching his inner ear with dirty hands and attempted to clear ear out with a q-tip. Came to the ED yesterday and was discharged on ciprodex now c/o of worsening pain. CT found pt to have both OE and OM, pt started on IV antibiotics. Patient decided to leave [place].

## 2022-09-16 NOTE — ED PROVIDER NOTE - NSFOLLOWUPINSTRUCTIONS_ED_ALL_ED_FT
You were seen in the Emergency Department for an external ear infection. Please use the drops as instructed. Follow up with your primary care doctor and ENT.     1) Continue all previously prescribed medications as directed.    2) Follow up with your primary care physician - take copies of your results.    3) Return to the Emergency Department for worsening or persistent symptoms, and/or ANY NEW OR CONCERNING SYMPTOMS.

## 2022-09-16 NOTE — CONSULT NOTE ADULT - SUBJECTIVE AND OBJECTIVE BOX
CC: ear pain    HPI: 35 yo M w/PMHx of Behcet's disease presents to the ED for L sided ear pain for several days, gradual onset, Pt reports severe pain to outer ear. Believes he has a ear infection after scratching his inner ear with dirty hands and attempted to clear ear out with a q-tip. Denies hearing changes, fever, neck pain, and rashes. No hearing issues.        PAST MEDICAL & SURGICAL HISTORY:  Corneal abrasion      Rash/skin eruption      Behcet&#x27;s disease      S/P ankle joint replacement  L ankle ORIF - 2005        Allergies    No Known Allergies    Intolerances      MEDICATIONS  (STANDING):  sodium chloride 0.9% Bolus 1000 milliLiter(s) IV Bolus once    MEDICATIONS  (PRN):      Social History: smokes medical marijuana     Family history: no pertinent family history     ROS:   ENT: all negative except as noted in HPI   CV: denies palpitations  Pulm: denies SOB, cough, hemoptysis  GI: denies change in apetite, indigestion, n/v  : denies pertinent urinary symptoms, urgency  Neuro: denies numbness/tingling, loss of sensation  Psych: denies anxiety  MS: denies muscle weakness, instability  Heme: denies easy bruising or bleeding  Endo: denies heat/cold intolerance, excessive sweating  Vascular: denies LE edema    Vital Signs Last 24 Hrs  T(C): 36.7 (16 Sep 2022 19:25), Max: 37.3 (16 Sep 2022 17:56)  T(F): 98.1 (16 Sep 2022 19:25), Max: 99.1 (16 Sep 2022 17:56)  HR: 95 (16 Sep 2022 19:25) (89 - 114)  BP: 130/85 (16 Sep 2022 19:25) (117/69 - 137/76)  BP(mean): 100 (16 Sep 2022 19:25) (100 - 100)  RR: 18 (16 Sep 2022 19:25) (15 - 20)  SpO2: 95% (16 Sep 2022 19:25) (95% - 100%)    Parameters below as of 16 Sep 2022 19:25  Patient On (Oxygen Delivery Method): room air                              14.1   17.30 )-----------( 257      ( 16 Sep 2022 19:57 )             43.5             PHYSICAL EXAM:  Gen: NAD  Skin: No rashes, bruises, or lesions  Head: Normocephalic, Atraumatic  Face: no edema, erythema, or fluctuance. Parotid glands soft without mass  Eyes: no scleral injection  Ears: Right - ear canal clear, TM intact without effusion or erythema. No evidence of any fluid drainage. No mastoid tenderness, erythema, or ear bulging            Left - ear canal narrowed and inflamed unable to visualize TM. +drainage. + mastoid tenderness, but no erythema  Nose: Nares bilaterally patent, no discharge  Mouth: No Stridor / Drooling / Trismus.  Mucosa moist, tongue/uvula midline, oropharynx clear  Neck: Flat, supple, no lymphadenopathy, trachea midline, no masses  Lymphatic: No lymphadenopathy  Resp: breathing easily, no stridor  CV: no peripheral edema/cyanosis  GI: nondistended   Peripheral vascular: no JVD or edema  Neuro: facial nerve intact, no facial droop      IMAGING/ADDITIONAL STUDIES: pending  CC: ear pain    HPI: 35 yo M w/PMHx of Behcet's disease presents to the ED for L sided ear pain for several days, gradual onset, Pt reports severe pain to outer ear. Believes he has a ear infection after scratching his inner ear with dirty hands and attempted to clear ear out with a q-tip. Denies hearing changes, fever, neck pain, and rashes. No hearing issues.        PAST MEDICAL & SURGICAL HISTORY:  Corneal abrasion      Rash/skin eruption      Behcet&#x27;s disease      S/P ankle joint replacement  L ankle ORIF - 2005        Allergies    No Known Allergies    Intolerances      MEDICATIONS  (STANDING):  sodium chloride 0.9% Bolus 1000 milliLiter(s) IV Bolus once    MEDICATIONS  (PRN):      Social History: smokes medical marijuana     Family history: no pertinent family history     ROS:   ENT: all negative except as noted in HPI   CV: denies palpitations  Pulm: denies SOB, cough, hemoptysis  GI: denies change in apetite, indigestion, n/v  : denies pertinent urinary symptoms, urgency  Neuro: denies numbness/tingling, loss of sensation  Psych: denies anxiety  MS: denies muscle weakness, instability  Heme: denies easy bruising or bleeding  Endo: denies heat/cold intolerance, excessive sweating  Vascular: denies LE edema    Vital Signs Last 24 Hrs  T(C): 36.7 (16 Sep 2022 19:25), Max: 37.3 (16 Sep 2022 17:56)  T(F): 98.1 (16 Sep 2022 19:25), Max: 99.1 (16 Sep 2022 17:56)  HR: 95 (16 Sep 2022 19:25) (89 - 114)  BP: 130/85 (16 Sep 2022 19:25) (117/69 - 137/76)  BP(mean): 100 (16 Sep 2022 19:25) (100 - 100)  RR: 18 (16 Sep 2022 19:25) (15 - 20)  SpO2: 95% (16 Sep 2022 19:25) (95% - 100%)    Parameters below as of 16 Sep 2022 19:25  Patient On (Oxygen Delivery Method): room air                              14.1   17.30 )-----------( 257      ( 16 Sep 2022 19:57 )             43.5             PHYSICAL EXAM:  Gen: NAD  Skin: No rashes, bruises, or lesions  Head: Normocephalic, Atraumatic  Face: no edema, erythema, or fluctuance. Parotid glands soft without mass  Eyes: no scleral injection  Ears: Right - ear canal clear, TM intact without effusion or erythema. No evidence of any fluid drainage. No mastoid tenderness, erythema, or ear bulging            Left - ear canal narrowed and inflamed unable to visualize TM. +drainage. + mastoid tenderness, but no erythema  Nose: Nares bilaterally patent, no discharge  Mouth: No Stridor / Drooling / Trismus.  Mucosa moist, tongue/uvula midline, oropharynx clear  Neck: Flat, supple, no lymphadenopathy, trachea midline, no masses  Lymphatic: No lymphadenopathy  Resp: breathing easily, no stridor  CV: no peripheral edema/cyanosis  GI: nondistended   Peripheral vascular: no JVD or edema  Neuro: facial nerve intact, no facial droop      IMAGING/ADDITIONAL STUDIES:     ACC: [id number] EXAM: CT IAC IC    PROCEDURE DATE: 09/16/2022        INTERPRETATION: CLINICAL INFORMATION: Left ear pain and mastoid tenderness.    TECHNIQUE: 0.6mm thin axial images through the temporal bones were obtained after the administration of IV contrast. Retro-reformatted high resolution images were obtained. Coronal and sagittal reformatted images were obtained. 90 cc of Omnipaque 350 administered. 0 cc discarded.    COMPARISON EXAMINATION: CT head 12/1/2007    FINDINGS:  RIGHT EXTERNAL AUDITORY CANAL: Normal.  RIGHT TYMPANOMASTOID CAVITY: Well developed and fully aerated.  RIGHT OSSICULAR CHAIN: Intact.  RIGHT OTIC CAPSULE STRUCTURES: There is normal cochlear segmentation. The modiolus is intact. The vestibular aqueduct and cochlear aqueduct are normal in size. The superior semicircular canal is intact.  RIGHT FACIAL NERVE CANAL: Intact.    LEFT EXTERNAL AUDITORY CANAL: There is wall thickening of the external auditory canal with soft tissue in the middle ear and thickening of the tympanic membrane.  LEFT TYMPANOMASTOID CAVITY: Well developed and fully aerated.  LEFT OSSICULAR CHAIN: Intact.  LEFT OTIC CAPSULE STRUCTURES: There is normal cochlear segmentation. The modiolus is intact. The vestibular aqueduct and cochlear aqueduct are normal in size. The superior semicircular canal is intact.  LEFT FACIAL NERVE CANAL: Intact.    VISUALIZED INTRACRANIAL STRUCTURES AND ORBITAL CONTENTS: Unremarkable.  BONES: The tegmen is intact bilaterally. The remaining visualized bones are normal in appearance.  MISCELLANEOUS: Right maxillary sinus polyp versus retention cyst.    IMPRESSION:  Acute left otitis media and otitis externa.    --- End of Report ---          LILIANA DOTSON MD; Resident Radiology  This document has been electronically signed.  SKYLER QUIGLEY MD; Attending Radiologist  This document has been electronically signed. Sep 16 2022 10:26PM

## 2022-09-16 NOTE — ED PROVIDER NOTE - CLINICAL SUMMARY MEDICAL DECISION MAKING FREE TEXT BOX
[facility]): Gentleman with x2 days gradual onset of L ear pain. Vital signs are stable. External ear is erythematous, consistent with otitis externa. Will prescribe Ciprodex and NSAIDs for pain. Return precautions given and discharge.

## 2022-09-16 NOTE — ED PROVIDER NOTE - ATTENDING CONTRIBUTION TO CARE
Patient's history and exam consistent with otitis externa without any signs of mastoiditis or systemic infection.  Patient will be treated with otic antibiotics and dexamethasone.  Patient was given ENT follow-up and return precautions.

## 2022-09-16 NOTE — ED ADULT NURSE NOTE - OBJECTIVE STATEMENT
36y m pt c/o left ear pain; pt states scratched inside ear about 1 week ago; pt states didn't hurt at first then pain and yellowish drainage; pt states was in er yesterday; drops were put in and felt much better; but has been unable to sleep due to pain; no further drainage; pt states pain radiating down neck; pt states feels swelling on l neck; pt states pain so bad sometimes cant breath; aox3; no resp distress; no chest pain; no vision changes; no diff swallow; pt smokes cannabis with no relief; has not taken any over the counter pain medications; pt very tearful waiting for doctor

## 2022-09-16 NOTE — ED PROVIDER NOTE - PROGRESS NOTE DETAILS
Pt's seen by ENT and recommended CT. Pt went to CT. Dr. Hernandez recommended adm to medicine for IV Levaquin and steroid tx. Pt was offered adm by Dr. Segovia but declined hospitalization. States feeling better now. Pt was informed of AMA discharge and possible risks/dangers after AMA discharge. Pt understood the information and well verbalized. Dr. Ramachandran recommended adm to medicine for IV Levaquin and steroid tx. Pt was offered adm by Dr. Segovia but declined hospitalization. States feeling better now.

## 2022-09-16 NOTE — ED PROVIDER NOTE - ATTENDING CONTRIBUTION TO CARE
37 yo male hx of Behcet's and recent dx of otitis externa on ciprodex drops presents for worsening pain.  denies fevers, afebrile here.  labs with leukocytosis, CT with evidence of both OE and OM.  plan to admit for IV abx and observation.  patient states he does not want to stay for treatment, that he feels uncomfortable here, does not like being in this section of the ED, and he is upset after several encounters where he felt staff members were rude to him.  tried to reiterate the medical necessity of IV abx and observation, and that he would be admitted and eventually leave the ED, but patient adamant about leaving AMA.  states he discussed this with his mother on the phone and would like his IV out.  will give rx for PO abx, outpatient follow-up, return precautions if he changes his mind about admission.  OMID signed.

## 2022-09-16 NOTE — ED PROVIDER NOTE - PHYSICAL EXAMINATION
NAD. Tachycardia. Afebrile. +left ear; auricle tender, swollen external canal with clear discharge, NAD. Tachycardia. Afebrile. +left ear; auricle tender, swollen external canal with clear discharge, unable to TM due to canal swelling. Left mastoid tender without obvious swelling. Lungs clear. Neuro- intact.

## 2022-09-16 NOTE — ED ADULT NURSE NOTE - CHIEF COMPLAINT QUOTE
left ear pain & discharges x 4 days. Benzoyl Peroxide Counseling: Patient counseled that medicine may cause skin irritation and bleach clothing.  In the event of skin irritation, the patient was advised to reduce the amount of the drug applied or use it less frequently.   The patient verbalized understanding of the proper use and possible adverse effects of benzoyl peroxide.  All of the patient's questions and concerns were addressed.

## 2022-09-16 NOTE — ED PROVIDER NOTE - OBJECTIVE STATEMENT
37 yo M w/PMHx of Behcet's disease presents to the ED for gradual L sided ear pain. Pt reports severe pain to outer ear. Believes he has a ear infection and attempted to clear ear out with a q-tip. Denies hearing changes, fever, neck pain, and rashes. 37 yo M w/PMHx of Behcet's disease presents to the ED for L sided ear pain. x2 days, gradual onset, Pt reports severe pain to outer ear. Believes he has a ear infection and attempted to clear ear out with a q-tip. Denies hearing changes, fever, neck pain, and rashes. No hearing issues.

## 2022-09-17 LAB — ERYTHROCYTE [SEDIMENTATION RATE] IN BLOOD: 41 MM/HR — HIGH (ref 0–15)

## 2022-11-07 NOTE — ED ADULT TRIAGE NOTE - BSA (M2)
Detail Level: Zone Consent: The patient's verbal consent was obtained including but not limited to risks of crusting, scabbing, blistering, scarring, darker or lighter pigmentary change, recurrence, incomplete removal and infection. Duration Of Freeze Thaw-Cycle (Seconds): 15 2.27 Render In Bullet Format When Appropriate: No Number Of Freeze-Thaw Cycles: 1 freeze-thaw cycle Post-Care Instructions: I reviewed with the patient in detail post-care instructions. Patient is to wear sunprotection, and avoid picking at any of the treated lesions. Pt may apply Vaseline to crusted or scabbing areas. Total Number Of Aks Treated: 1 Render Post-Care Instructions In Note?: yes

## 2025-05-13 NOTE — ED ADULT TRIAGE NOTE - RESPIRATORY RATE (BREATHS/MIN)
"Progress Note - Hospitalist   Name: Alexis Dennison 67 y.o. male I MRN: [id number]  Unit/Bed#: -01 I Date of Admission: 5/7/2025   Date of Service: 5/13/2025 I Hospital Day: 6    Assessment & Plan  Primary central nervous system (CNS) lymphoma  Patient with development of worsening confusion, and was seen in the ED on 3/24/2025.  CTH = brain lesion   MRI brain 3/26/2025  \"multiple scattered areas of subependymoma nodular enhancement throughout ventricles with mild hydrocephalus left worse than right, scattered nodular areas of enhancement in left anterior inferior basal ganglia involving left anterior commissure, and smaller foci of nodular enhancement along anteromedial aspect of the left cerebral peduncle and left quirino.\"  S/p LP 3/31/25:  + CNS lymphoma.  Culture negative.  Lymphoma/leukemia panel was nondiagnostic  CTH 4/3/25: concerning for worsening hydrocephalus.   Repeat LP 4/7/25 = undiagnostic again   MRI brain 4/11/25: \"Interval enlargement of the dominant left anterior basal ganglionic mass lesion with greater surrounding vasogenic edema and mass effect. Redemonstrated findings of leptomeningeal and intraventricular seeding with obstructive hydrocephalus and ransependymal flow of CSF\"  S/p brain bx 4/14 = preliminary large B-cell lymphoma.  S/p EVD, self removed 4/26  S/p Keppra 500mg BID x 7 days for postoperative seizure ppx   Started on chemotherapy during hospital stay and is for weekly Rituximab and Methotrexate every 2 weeks  Daily serum methotrexate levels until level is less than 0.1  Per heme-onc monitoring urine pH and Methotrexate levels do not need further monitoring at this time   Continue Dexamethasone taper = LD will be 5/19/25  Maintain PICC line  H-O following  Had Rituxan 5/10/25  Type 2 diabetes mellitus with hyperglycemia, without long-term current use of insulin (HCC)  Hemoglobin A1C was 6.6 on 2/22/25  Sees Endo [facility] in [place]  Home:  Janumet XR  qd  Here: Lantus " "10 U qhs/Lispro 6 U TID/Metformin 1000 mg qd  Insulin wasn't here 5/8 AM so didn't get Lispro with breakfast which explains why lunch BS was elevated.   Metformin was ordered on transfer to be restarted 5/8/25 AM  5/8/25:  BS at dinnertime 195, evening of 5/7/25 was 250.  Since Metformin was just started 5/8/25 AM, reduced Lispro to 3U TID from 8U   On 5/10/25, increased Lispro WM to 4U TID   BSs should continue to improve with steroid being tapered (LD will be 5/19)  On 5/12/25, increased Lispro WM to 6U TID  Will restart Januvia 50 mg qd tomorrow 5/14/25 and stop the Lantus tonight 5/13/25.  Keep Lispro WM same for today.  Continue DM diet  Mixed hyperlipidemia  Continue atorvastatin  Thyroid nodule  TSH/free T4 4/21/25 = 0.019/1.13  Nonemergent outpatient endocrine follow-up.   Will require outpatient TSH, free T4, +/- further imaging with endocrine such as radioactive uptake  Will repeat TSH/free T4 and add on to yesterdays labs 5/12/25   Pulmonary nodule  CT chest 3 months follow-up  Liver lesion  Patient will require outpatient follow-up  Encephalopathy  Improving  Continue Seroquel  Continue steroid taper  On 1:1  LETY (acute kidney injury) (HCC)  Monitor status post methotrexate  Previous baseline as OP 5/2023-2/22/25 was 1.1 to 1.0  CMP 5/9/25 with creatinine of 1.18 down from 1.23 on 5/8 and on 5/10 was 1.15  CMP on 5/12/25 showed creatinine stable at 1.1  Sepsis (HCC)  Resolved  Continue to monitor off antibiotics  E coli bacteremia  Patient completed 7 days of IV cefazolin which was finished on May 5  Was due to E. Coli UTI  HTN (hypertension)  Home:  Losartan -25 qd  Here:  no meds for now since he is normotensive  Transaminitis  AST is 111, previously 114,  ALT is 322 previously 149  D/w H-O, they are aware =  \"Could be due to recent chemotherapy versus antibiotics versus liver lesions\".    CMP 5/9/25 = AST 57 (previously 111),  (previously 322)  Almost totally resolved now with AST 57 and "   Leukopenia  WBC is stable at 5.9 on 5/12/25  H-O following  Hyponatremia  Mild   CMP 5/12/25 now shows resolution of the hyponatremia  Thrombocytopenia (HCC)  Platelet count down to 117 from 160 on 5/12/25  ?from chemo  Per H-O    The above assessment and plan was reviewed and updated as determined by my evaluation of the patient on 5/13/2025.    History of Present Illness   Patient seen and examined. Patients overnight issues or events were reviewed with nursing staff. New or overnight issues include the following:   No new or overnight issues.  Offers no complaints    Review of Systems   All other systems reviewed and are negative.      Objective :  Temp:  [97.5 °F (36.4 °C)-97.8 °F (36.6 °C)] 97.7 °F (36.5 °C)  HR:  [72-79] 72  BP: (109-117)/(70-72) 117/70  Resp:  [17-18] 18  SpO2:  [96 %-98 %] 98 %  O2 Device: None (Room air)    Invasive Devices       Peripherally Inserted Central Catheter Line  Duration             PICC Line 05/01/25 Left Brachial 12 days              Drain  Duration             External Urinary Catheter 10 days                    Physical Exam  General Appearance: no distress, nontoxic appearing  HEENT:  External ear normal.  Nose normal w/o drainage. Mucous membranes are moist. Oropharynx is clear. Conjunctiva clear w/o icterus or redness.  Neck:  Supple, normal ROM  Lungs: BBS without crackles/wheeze/rhonchi; respirations unlabored with normal inspiratory/expiratory effort.  No retractions noted.  On RA  CV: regular rate and rhythm; no rubs/murmurs/gallops, PMI normal   ABD: Abdomen is soft.  Bowel sounds all quadrants.  Nontender with no distention.    EXT: no edema  Skin: normal turgor, normal texture  Psych: affect normal, mood normal  Neuro: AA       The above physical exam was reviewed and updated as determined by my evaluation of the patient on 5/13/2025.      Lab Results: I have reviewed the following results:  Results from last 7 days   Lab Units 05/12/25  0608  20 05/09/25  0512   WBC Thousand/uL 5.16 3.36*   HEMOGLOBIN g/dL 9.3* 8.8*   HEMATOCRIT % 26.4* 24.7*   PLATELETS Thousands/uL 117* 160     Results from last 7 days   Lab Units 05/12/25  0614 05/10/25  1023   SODIUM mmol/L 136 132*   POTASSIUM mmol/L 4.3 4.4   CHLORIDE mmol/L 101 98   CO2 mmol/L 30 25   BUN mg/dL 33* 34*   CREATININE mg/dL 1.11 1.15   CALCIUM mg/dL 9.0 9.1             Results from last 7 days   Lab Units 05/13/25  1119 05/13/25  0616 05/12/25  2045   POC GLUCOSE mg/dl 160* 168* 98       Imaging Results Review: No pertinent imaging studies reviewed.  Other Study Results Review: No additional pertinent studies reviewed.    Review of Scheduled Meds: Medications  reviewed and reconciled as needed  Current Facility-Administered Medications   Medication Dose Route Frequency Provider Last Rate    acetaminophen  650 mg Oral Q6H PRN Crispin Arana MD      allopurinol  300 mg Oral Daily Crispin Arana MD      alteplase  2 mg Intracatheter Q1MIN PRN Crispin Arana MD      alteplase  2 mg Intracatheter Q1MIN PRN Magali Lee MD      aluminum-magnesium hydroxide-simethicone  30 mL Oral Q4H PRN Crispin Arana MD      atorvastatin  20 mg Oral Daily Crispin Arana MD      bisacodyl  10 mg Rectal Daily PRN Jessica Arreola, DO      calcium carbonate  1,000 mg Oral Daily PRN Crispin Arana MD      chlorhexidine  15 mL Mouth/Throat Q12H CAIT Arana MD      dexamethasone  2 mg Oral Q12H CAIT Arana MD      Followed by    [START ON 5/15/2025] dexamethasone  1 mg Oral Q12H CAIT Arana MD      docusate sodium  100 mg Oral BID Jessica T Arreola, DO      heparin (porcine)  5,000 Units Subcutaneous Q8H CAIT Arana MD      insulin glargine  10 Units Subcutaneous HS Crispin Arana MD      insulin lispro  2-12 Units Subcutaneous 4x Daily (AC & HS) Crispin Arana MD      insulin lispro  6 Units Subcutaneous TID With Meals PATI Porras      lactulose  20 g Oral Daily PRN Joshua Kaminski,  DO      melatonin  6 mg Oral HS Crispin Arana MD      metFORMIN  1,000 mg Oral Daily Crispin Arana MD      OLANZapine  5 mg Intramuscular BID PRN Crispin Arana MD      ondansetron  4 mg Intravenous Q6H PRN Crispin Arana MD      oxyCODONE  2.5 mg Oral Q4H PRN Crispin Arana MD      Or    oxyCODONE  5 mg Oral Q4H PRN Crispin Arana MD      pantoprazole  40 mg Oral Early Morning Crispin Arana MD      polyethylene glycol  17 g Oral Daily PRN Jessica T Arreola, DO      QUEtiapine  12.5 mg Oral Daily Crispin Arana MD      QUEtiapine  50 mg Oral HS Crispin Arana MD      senna  2 tablet Oral Daily With Lunch Jessica BHUPENDRA Arreola, DO      sodium chloride  20 mL/hr Intravenous Once PRN Crispin Arana MD      sodium chloride  20 mL/hr Intravenous Once PRN Magali Lee MD         VTE Pharmacologic Prophylaxis: HSQ  Code Status: Level 1 - Full Code  Current Length of Stay: 6 day(s)    Administrative Statements     ** Please Note:  voice to text software may have been used in the creation of this document. Although proof errors in transcription or interpretation are a potential of such software**